# Patient Record
Sex: FEMALE | Race: WHITE
[De-identification: names, ages, dates, MRNs, and addresses within clinical notes are randomized per-mention and may not be internally consistent; named-entity substitution may affect disease eponyms.]

---

## 2017-03-06 ENCOUNTER — HOSPITAL ENCOUNTER (EMERGENCY)
Dept: HOSPITAL 17 - PHED | Age: 22
Discharge: HOME | End: 2017-03-06
Payer: MEDICAID

## 2017-03-06 VITALS
HEART RATE: 72 BPM | OXYGEN SATURATION: 100 % | DIASTOLIC BLOOD PRESSURE: 64 MMHG | TEMPERATURE: 98.3 F | SYSTOLIC BLOOD PRESSURE: 95 MMHG | RESPIRATION RATE: 16 BRPM

## 2017-03-06 VITALS — BODY MASS INDEX: 20.75 KG/M2 | WEIGHT: 124.56 LBS | HEIGHT: 65 IN

## 2017-03-06 DIAGNOSIS — J02.9: Primary | ICD-10-CM

## 2017-03-06 PROCEDURE — 99283 EMERGENCY DEPT VISIT LOW MDM: CPT

## 2017-03-06 NOTE — PD
HPI


Chief Complaint:  ENT Complaint


Time Seen by Provider:  18:56


Travel History


International Travel<30 days:  No


Contact w/Intl Traveler<30days:  No


Traveled to known affect area:  No





History of Present Illness


HPI


The patient is a 21-year-old  female who presents to the emergency 

department for sore throat.  The patient is a sore throat last 5 days with pain 

in swallowing.  She also notes subjective fevers, white patches on Bactrim 

throat, and chronic left ear discomfort which she attributes to chronic fluid 

behind the left ear.  The patient does note occasional dry nonproductive cough, 

however, denies any shortness of breath.  Patient denies any nausea, vomiting, 

abdominal pain, myalgias, arthralgias, or rash.  She does have a history of 

recurrent strep infections.  Symptoms are moderate, there are no alleviating or 

exacerbate factors.  The patient's last menstrual cycle was 2 days ago, she 

denies current pregnancy.





PFSH


Past Medical History


Medical History:  Denies Significant Hx


Diminished Hearing:  No


Immunizations Current:  Yes


Pregnant?:  Not Pregnant


LMP:  3/5/17





Past Surgical History


Surgical History:  No Previous Surgery





Social History


Alcohol Use:  No


Tobacco Use:  No


Substance Use:  No





Allergies-Medications


(Allergen,Severity, Reaction):  


Coded Allergies:  


     No Known Allergies (Unverified , 3/6/17)


Reported Meds & Prescriptions





Reported Meds & Active Scripts


Active


No Active Prescriptions or Reported Medications    








Review of Systems


General / Constitutional:  Positive: Fever (subjective)


HENT:  Positive: Sore Throat, Neck Pain, Earache


Respiratory:  Positive: Cough,  No: Shortness of Breath


Gastrointestinal:  No: Nausea, Vomiting, Abdominal Pain


Musculoskeletal:  No: Myalgias, Arthralgias


Skin:  No Rash





Physical Exam


Narrative


GENERAL: Awake, alert, pleasant 21-year-old female who appears her stated age 

and is in no acute respiratory distress.


SKIN: Warm and dry.


HEAD: Atraumatic. Normocephalic. 


EYES: Pupils equal and round. No scleral icterus. No injection or drainage. 


ENT: No nasal bleeding or discharge.  Oropharynx reveals enlarged tonsils with 

erythema and white exudate bilateral.  Right TM is translucent and right EAC is 

clear.  The left tympanic membrane is dull with air-fluid level but no bulging 

or retraction.


NECK: Trachea midline. No JVD.  Minimal bilateral cervical lymphadenopathy 

which is mobile minimally tender.


CARDIOVASCULAR: Regular rate and rhythm.  No murmur appreciated.


RESPIRATORY: No accessory muscle use. Clear to auscultation. Breath sounds 

equal bilaterally. 


MUSCULOSKELETAL: No obvious deformities. No clubbing.  No cyanosis.  No edema. 


NEUROLOGICAL: Awake and alert. No obvious cranial nerve deficits.  Motor 

grossly within normal limits. Normal speech.


PSYCHIATRIC: Appropriate mood and affect; insight and judgment normal.





Data


Data


Last Documented VS





Vital Signs








  Date Time  Temp Pulse Resp B/P Pulse Ox O2 Delivery O2 Flow Rate FiO2


 


3/6/17 18:38 98.3 72 16 95/64 100   











MDM


Medical Decision Making


Medical Screen Exam Complete:  Yes


Emergency Medical Condition:  Yes


Medical Record Reviewed:  Yes


Differential Diagnosis


Differential diagnosis includes exudative pharyngitis, strep pharyngitis, viral 

pharyngitis, mononucleosis, URI, gonorrhea, diphtheria.


Narrative Course


The patient's physical examination is consistent with exudative pharyngitis 

with history of recurrent strep pharyngitis.  Therefore, patient will be 

treated with Pen-Vee K 500 mg 4 times a day for 10 days.  The patient is 

advised to follow-up with her primary physician and return if symptoms worsen 

or progress.  Over-the-counter Tylenol and/or Motrin as needed for pain.





Diagnosis





 Primary Impression:  


 Exudative pharyngitis


Patient Instructions:  General Instructions





***Additional Instructions:


Tylenol and/or Motrin as needed for pain.  Pen-Vee K 4 times a day for 10 days.

  Frequent hand washing at home.  Follow-up with her primary doctor.  Return if 

symptoms worsen or progress.


***Med/Other Pt SpecificInfo:  Prescription(s) given


Scripts


Penicillin V Potassium (Penicillin Vk)250 Mg Nma512 Mg PO Q6H  10 Days  Ref 0


   Prov:Edson Sinha MD         3/6/17


Disposition:  01 DISCHARGE HOME


Condition:  Stable








Edson Sinha MD Mar 6, 2017 19:05

## 2017-05-15 ENCOUNTER — HOSPITAL ENCOUNTER (EMERGENCY)
Dept: HOSPITAL 17 - PHEFT | Age: 22
Discharge: HOME | End: 2017-05-15
Payer: MEDICAID

## 2017-05-15 VITALS
HEART RATE: 81 BPM | SYSTOLIC BLOOD PRESSURE: 109 MMHG | RESPIRATION RATE: 16 BRPM | OXYGEN SATURATION: 100 % | TEMPERATURE: 98.2 F | DIASTOLIC BLOOD PRESSURE: 64 MMHG

## 2017-05-15 VITALS — BODY MASS INDEX: 20.57 KG/M2 | HEIGHT: 65 IN | WEIGHT: 123.46 LBS

## 2017-05-15 DIAGNOSIS — S92.522A: Primary | ICD-10-CM
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
## 2017-05-15 PROCEDURE — L3260 AMBULATORY SURGICAL BOOT EAC: HCPCS

## 2017-05-15 PROCEDURE — 73630 X-RAY EXAM OF FOOT: CPT

## 2017-05-15 PROCEDURE — 99283 EMERGENCY DEPT VISIT LOW MDM: CPT

## 2017-05-15 NOTE — PD
HPI


Chief Complaint:  Injury


Time Seen by Provider:  19:23


Travel History


International Travel<30 days:  No


Contact w/Intl Traveler<30days:  No


Traveled to known affect area:  No





History of Present Illness


HPI


21-year-old female presents to the emergency room for evaluation of left second 

toe pain and swelling after dropping a fire extinguisher on her foot just prior 

to arrival.  Patient came straight from work and has not taken anything for 

pain.  She denies pain anywhere else in her foot.  Pain is worse with range of 

motion and palpation.  Denies paresthesias.  No chronic medical conditions or 

daily medications.





[facility]


Past Medical History


Medical History:  Denies Significant Hx


Diminished Hearing:  No


Immunizations Current:  Yes


Pregnant?:  Not Pregnant





Past Surgical History


Surgical History:  No Previous Surgery





Social History


Alcohol Use:  No


Tobacco Use:  No


Substance Use:  No





Allergies-Medications


(Allergen,Severity, Reaction):  


Coded Allergies:  


     No Known Allergies (Unverified , 5/15/17)


Reported Meds & Prescriptions





Reported Meds & Active Scripts


Active


No Active Prescriptions or Reported Medications    








Review of Systems


Except as stated in HPI:  all other systems reviewed are Neg





Physical Exam


Narrative


GENERAL: Well-nourished, well-developed female in no acute distress.  Afebrile.

  Ambulatory.


SKIN: Focused skin assessment warm/dry.  Mild to moderate ecchymosis of the 

left second toe.


HEAD: Normocephalic.


EYES: No scleral icterus. No injection or drainage. 


NECK: Supple, trachea midline. No JVD or lymphadenopathy.


CARDIOVASCULAR: Regular rate and rhythm without murmurs, gallops, or rubs. 


RESPIRATORY: Breath sounds equal bilaterally. No accessory muscle use.


EXTREMITY: Left second toe tender to palpation especially in the middle 

phalanx.  Limited range of motion in that has secondary to pain.  Moderate 

edema.  Less than 2 second capillary refill distally.





Data


Data


Last Documented VS





Vital Signs








  Date Time  Temp Pulse Resp B/P Pulse Ox O2 Delivery O2 Flow Rate FiO2


 


5/15/17 18:20 98.2 81 16 109/64 100   








Orders





 Foot, Complete ([id number]) (5/15/17 )


Splint Or Brace Apply/Monitor (5/15/17 19:20)








MDM


Medical Decision Making


Medical Screen Exam Complete:  Yes


Emergency Medical Condition:  Yes


Medical Record Reviewed:  Yes


Differential Diagnosis


Fracture versus contusion versus abrasion versus sprain


Narrative Course


21-year-old female presents to the emergency room for evaluation of left second 

toe pain and swelling after dropping a fire extinguisher on it just prior to 

arrival.  Patient denies any other injuries.  Physical exam reveals moderate 

ecchymosis and edema of the left second toe.  It is extremely tender to 

palpation.  Less than 2 second capillary refill distally.  X-ray shows 

minimally displaced middle phalanx fracture.  Toe was buddy taped to the 

adjacent toe and patient was placed in a postop shoe.  She was discharged with 

orthopedic instructions and told to follow up with her primary care physician 

or return to the emergency room for worsening symptoms.  She understands and 

agrees to plan.





Diagnosis





 Primary Impression:  


 Toe fracture, left


 Qualified Code:  S92.522A - Closed displaced fracture of middle phalanx of 

lesser toe of left foot, initial encounter


Referrals:  


Primary Care Physician


Patient Instructions:  General Instructions, Toe Fracture (ED)





***Additional Instructions:


Rest and drink plenty of fluids.


Take ibuprofen with food as directed, as needed for pain.


Apply ice to the affected area for 20 minutes at a time, as needed for pain and 

swelling.


Follow-up with a primary care physician.


Return to the emergency room for worsening symptoms.


Scripts


No Active Prescriptions or Reported Meds


Disposition:  01 DISCHARGE HOME


Condition:  Stable








Glory Juarez May 15, 2017 19:26

## 2017-05-15 NOTE — RADHPO
EXAM DATE/TIME:  05/15/2017 18:55 

 

[facility] COMPARISON:     

No previous studies available for comparison.

 

                     

INDICATIONS :     

Pain distal left mtatarsal/phalanges today ; patient dropped fire extinguisher on left foot at work.

                     

 

MEDICAL HISTORY :     

None.          

 

SURGICAL HISTORY :     

None.   

 

ENCOUNTER:     

Initial                                        

 

ACUITY:     

1 day      

 

PAIN SCORE:     

10/10

 

LOCATION:     

Left  foot

 

FINDINGS:     

Three view examination of the left foot demonstrates soft tissue swelling and minimally displaced fra
cture middle phalanx of second toe. No other fractures.

 

CONCLUSION:     

Minimally displaced fracture middle phalanx second digit.

 

 

 

 Stanton Martinez MD on May 15, 2017 at 19:11           

Board Certified Radiologist.

 This report was verified electronically.

## 2018-01-04 ENCOUNTER — HOSPITAL ENCOUNTER (EMERGENCY)
Dept: HOSPITAL 17 - PHEFT | Age: 23
Discharge: HOME | End: 2018-01-04
Payer: MEDICAID

## 2018-01-04 VITALS
SYSTOLIC BLOOD PRESSURE: 106 MMHG | RESPIRATION RATE: 18 BRPM | OXYGEN SATURATION: 100 % | DIASTOLIC BLOOD PRESSURE: 65 MMHG | HEART RATE: 75 BPM | TEMPERATURE: 98.3 F

## 2018-01-04 VITALS — HEIGHT: 65 IN | BODY MASS INDEX: 20.57 KG/M2 | WEIGHT: 123.46 LBS

## 2018-01-04 DIAGNOSIS — J02.9: Primary | ICD-10-CM

## 2018-01-04 PROCEDURE — 99283 EMERGENCY DEPT VISIT LOW MDM: CPT

## 2018-01-04 PROCEDURE — 87880 STREP A ASSAY W/OPTIC: CPT

## 2018-01-04 PROCEDURE — 87081 CULTURE SCREEN ONLY: CPT

## 2018-01-04 NOTE — PD
HPI


Chief Complaint:  Cold / Flu Symptoms


Time Seen by Provider:  13:12


Travel History


International Travel<30 days:  No


Contact w/Intl Traveler<30days:  No


Traveled to known affect area:  No





History of Present Illness


HPI


This patient complains sore throat.  She does not have cough or vomiting or 

diarrhea.  No nasal congestion.  Symptoms severity is moderate.  She has pain 

when she swallows.  No fever.  No alleviating factors





PFSH


Past Medical History


Diminished Hearing:  No


Immunizations Current:  Yes


Pregnant?:  Not Pregnant


LMP:  LAST WEEK





Social History


Alcohol Use:  No


Tobacco Use:  No


Substance Use:  No





Allergies-Medications


(Allergen,Severity, Reaction):  


Coded Allergies:  


     No Known Allergies (Unverified  Adverse Reaction, Unknown, 1/4/18)


Reported Meds & Prescriptions





Reported Meds & Active Scripts


Active


Magic Mouthwash Adult Liq (Multi-Ingredient Mouthwash/Gargle) 120 Ml Susp 10 Ml 

SWISH-SWAL ACHS


     Each 5mL contains: Nystatin 200,000units,


     Diphenhydramine 4.25mg, Viscous Lidocaine 10mg,


     Cherry syrup 0.8 mL








Review of Systems


General / Constitutional:  No: Fever


HENT:  Positive: Sore Throat


Cardiovascular:  No: Chest Pain or Discomfort


Respiratory:  No: Cough


Gastrointestinal:  No: Nausea





Physical Exam


Narrative


RESPIRATORY: Respiratory effort unlabored, no retractions or use of accessory 

muscles.  Breath sounds are clear and symmetric.





GASTROINTESTINAL:  Abdomen soft, non-tender, nondistended. Positive bowel 

sounds.  No hepato-splenomegaly, or palpable masses. No guarding.








SKIN: Focused skin assessment reveals no rash or ulcers. Skin is warm and dry.  

Palpation shows no induration or nodules.





Throat: Enlarged tonsils bilaterally with exudate, uvula midline.  Posterior 

pharynx is erythematous





Data


Data


Last Documented VS





Vital Signs








  Date Time  Temp Pulse Resp B/P (MAP) Pulse Ox O2 Delivery O2 Flow Rate FiO2


 


1/4/18 12:20 98.3 75 18 106/65 (79) 100   








Orders





 Orders


Group A Rapid Strep Screen (1/4/18 12:59)


Strep Culture (Group A) (1/4/18 13:05)








MDM


Medical Decision Making


Medical Screen Exam Complete:  Yes


Emergency Medical Condition:  Yes


Medical Record Reviewed:  Yes


Differential Diagnosis


Pharyngitis, URI, peritonsillar abscess


Narrative Course


I have reviewed the patient's electronic medical record.





Rapid strep screen is negative


Magic mouthwash written for symptom relief





Diagnosis





 Primary Impression:  


 Pharyngitis, acute


 Qualified Codes:  J02.9 - Acute pharyngitis, unspecified





***Additional Instructions:  


The patient was advised to follow up with their physician and return if they 

worsen.


The patient was warned about potential sedation for the medications they will 

receive on prescription.


***Med/Other Pt SpecificInfo:  Prescription(s) given


Scripts


Nystatin-Diphenhydramine-Lidocaine Liq (Magic Mouthwash Adult Liq) 120 Ml Susp


10 ML SWISH-SWAL ACHS for Mouth sores, #120 ML 0 Refills


   Each 5mL contains: Nystatin 200,000units,


   Diphenhydramine 4.25mg, Viscous Lidocaine 10mg,


   Cherry syrup 0.8 mL


   Prov: Lane Baker MD         1/4/18


Disposition:  01 DISCHARGE HOME


Condition:  Stable











Lane Baker MD Jan 4, 2018 13:21